# Patient Record
Sex: MALE | Race: BLACK OR AFRICAN AMERICAN | NOT HISPANIC OR LATINO | Employment: UNEMPLOYED | URBAN - METROPOLITAN AREA
[De-identification: names, ages, dates, MRNs, and addresses within clinical notes are randomized per-mention and may not be internally consistent; named-entity substitution may affect disease eponyms.]

---

## 2017-10-03 ENCOUNTER — HOSPITAL ENCOUNTER (EMERGENCY)
Facility: MEDICAL CENTER | Age: 62
End: 2017-10-03
Attending: EMERGENCY MEDICINE
Payer: COMMERCIAL

## 2017-10-03 ENCOUNTER — APPOINTMENT (OUTPATIENT)
Dept: RADIOLOGY | Facility: MEDICAL CENTER | Age: 62
End: 2017-10-03
Attending: EMERGENCY MEDICINE
Payer: COMMERCIAL

## 2017-10-03 VITALS
HEIGHT: 72 IN | SYSTOLIC BLOOD PRESSURE: 140 MMHG | HEART RATE: 68 BPM | BODY MASS INDEX: 25.86 KG/M2 | DIASTOLIC BLOOD PRESSURE: 97 MMHG | WEIGHT: 190.92 LBS | OXYGEN SATURATION: 95 % | TEMPERATURE: 96.8 F | RESPIRATION RATE: 16 BRPM

## 2017-10-03 DIAGNOSIS — R44.3 HALLUCINATIONS: ICD-10-CM

## 2017-10-03 DIAGNOSIS — Z72.0 TOBACCO ABUSE: ICD-10-CM

## 2017-10-03 LAB
ALBUMIN SERPL BCP-MCNC: 3.6 G/DL (ref 3.2–4.9)
ALBUMIN/GLOB SERPL: 0.9 G/DL
ALP SERPL-CCNC: 83 U/L (ref 30–99)
ALT SERPL-CCNC: 6 U/L (ref 2–50)
AMPHET UR QL SCN: NEGATIVE
ANION GAP SERPL CALC-SCNC: 6 MMOL/L (ref 0–11.9)
APTT PPP: 43.6 SEC (ref 24.7–36)
AST SERPL-CCNC: 22 U/L (ref 12–45)
BARBITURATES UR QL SCN: NEGATIVE
BASOPHILS # BLD AUTO: 0.3 % (ref 0–1.8)
BASOPHILS # BLD: 0.01 K/UL (ref 0–0.12)
BENZODIAZ UR QL SCN: NEGATIVE
BILIRUB SERPL-MCNC: 0.7 MG/DL (ref 0.1–1.5)
BUN SERPL-MCNC: 15 MG/DL (ref 8–22)
BZE UR QL SCN: POSITIVE
CALCIUM SERPL-MCNC: 9.4 MG/DL (ref 8.5–10.5)
CANNABINOIDS UR QL SCN: NEGATIVE
CHLORIDE SERPL-SCNC: 103 MMOL/L (ref 96–112)
CO2 SERPL-SCNC: 25 MMOL/L (ref 20–33)
CREAT SERPL-MCNC: 0.86 MG/DL (ref 0.5–1.4)
EOSINOPHIL # BLD AUTO: 0.25 K/UL (ref 0–0.51)
EOSINOPHIL NFR BLD: 8.1 % (ref 0–6.9)
ERYTHROCYTE [DISTWIDTH] IN BLOOD BY AUTOMATED COUNT: 45.1 FL (ref 35.9–50)
GFR SERPL CREATININE-BSD FRML MDRD: >60 ML/MIN/1.73 M 2
GLOBULIN SER CALC-MCNC: 3.9 G/DL (ref 1.9–3.5)
GLUCOSE SERPL-MCNC: 75 MG/DL (ref 65–99)
HCT VFR BLD AUTO: 30.6 % (ref 42–52)
HGB BLD-MCNC: 9.8 G/DL (ref 14–18)
IMM GRANULOCYTES # BLD AUTO: 0 K/UL (ref 0–0.11)
IMM GRANULOCYTES NFR BLD AUTO: 0 % (ref 0–0.9)
INR PPP: 1.17 (ref 0.87–1.13)
LYMPHOCYTES # BLD AUTO: 1.56 K/UL (ref 1–4.8)
LYMPHOCYTES NFR BLD: 50.3 % (ref 22–41)
MCH RBC QN AUTO: 30.2 PG (ref 27–33)
MCHC RBC AUTO-ENTMCNC: 32 G/DL (ref 33.7–35.3)
MCV RBC AUTO: 94.2 FL (ref 81.4–97.8)
METHADONE UR QL SCN: NEGATIVE
MONOCYTES # BLD AUTO: 0.27 K/UL (ref 0–0.85)
MONOCYTES NFR BLD AUTO: 8.7 % (ref 0–13.4)
NEUTROPHILS # BLD AUTO: 1.01 K/UL (ref 1.82–7.42)
NEUTROPHILS NFR BLD: 32.6 % (ref 44–72)
NRBC # BLD AUTO: 0 K/UL
NRBC BLD AUTO-RTO: 0 /100 WBC
OPIATES UR QL SCN: NEGATIVE
OXYCODONE UR QL SCN: NEGATIVE
PCP UR QL SCN: NEGATIVE
PLATELET # BLD AUTO: 178 K/UL (ref 164–446)
PMV BLD AUTO: 10 FL (ref 9–12.9)
POC BREATHALIZER: 0 PERCENT (ref 0–0.01)
POTASSIUM SERPL-SCNC: 3.6 MMOL/L (ref 3.6–5.5)
PROPOXYPH UR QL SCN: NEGATIVE
PROT SERPL-MCNC: 7.5 G/DL (ref 6–8.2)
PROTHROMBIN TIME: 15.3 SEC (ref 12–14.6)
RBC # BLD AUTO: 3.25 M/UL (ref 4.7–6.1)
SODIUM SERPL-SCNC: 134 MMOL/L (ref 135–145)
WBC # BLD AUTO: 3.1 K/UL (ref 4.8–10.8)

## 2017-10-03 PROCEDURE — 302970 POC BREATHALIZER: Performed by: EMERGENCY MEDICINE

## 2017-10-03 PROCEDURE — 80307 DRUG TEST PRSMV CHEM ANLYZR: CPT

## 2017-10-03 PROCEDURE — 93970 EXTREMITY STUDY: CPT | Mod: 26 | Performed by: SURGERY

## 2017-10-03 PROCEDURE — 85610 PROTHROMBIN TIME: CPT

## 2017-10-03 PROCEDURE — 90791 PSYCH DIAGNOSTIC EVALUATION: CPT

## 2017-10-03 PROCEDURE — 99284 EMERGENCY DEPT VISIT MOD MDM: CPT

## 2017-10-03 PROCEDURE — 70450 CT HEAD/BRAIN W/O DYE: CPT

## 2017-10-03 PROCEDURE — 700102 HCHG RX REV CODE 250 W/ 637 OVERRIDE(OP): Performed by: EMERGENCY MEDICINE

## 2017-10-03 PROCEDURE — 85025 COMPLETE CBC W/AUTO DIFF WBC: CPT

## 2017-10-03 PROCEDURE — A9270 NON-COVERED ITEM OR SERVICE: HCPCS | Performed by: EMERGENCY MEDICINE

## 2017-10-03 PROCEDURE — 85730 THROMBOPLASTIN TIME PARTIAL: CPT

## 2017-10-03 PROCEDURE — 80053 COMPREHEN METABOLIC PANEL: CPT

## 2017-10-03 PROCEDURE — 93970 EXTREMITY STUDY: CPT

## 2017-10-03 RX ORDER — OLANZAPINE 10 MG/1
10 TABLET ORAL
Qty: 30 TAB | Refills: 0 | Status: SHIPPED | OUTPATIENT
Start: 2017-10-03

## 2017-10-03 RX ORDER — OLANZAPINE 5 MG/1
10 TABLET ORAL ONCE
Status: COMPLETED | OUTPATIENT
Start: 2017-10-03 | End: 2017-10-03

## 2017-10-03 RX ADMIN — OLANZAPINE 10 MG: 5 TABLET, FILM COATED ORAL at 10:53

## 2017-10-03 NOTE — ED PROVIDER NOTES
"ED Provider Note    ER PROVIDER NOTE    Scribed for Angel Luis Echavarria M.D.  by Angel Luis Echavarria. 10/3/2017 at 7:45 AM.    Primary Care Provider: No primary care provider on file.  Means of Arrival: pt   History obtained from: pt   History limited by: none     CHIEF COMPLAINT  Chief Complaint   Patient presents with   • Psych Eval     depressed.  denies HI/SI       HPI  Julio Cesar Frazier is a 62 y.o. male who presents to the emergency department complaining ofSeeing demons. Patient reports that he has been seeing these demons for a long time, they are \"invading his body\". He denies any suicidal thoughts or homicidal thoughts. Patient denies any ingestions. Also patient does report he has history of blood clots in his legs and has not been taking his medications for them because the demons keeps stealing them. He denies any chest pain or difficulty breathing. No fevers or chills. No headaches. No other complaints    REVIEW OF SYSTEMS  Pertinent positives include hallucinations. Pertinent negatives include no fever. See HPI for details. All other systems reviewed and are negative.    PAST MEDICAL HISTORY       SURGICAL HISTORY  patient denies any surgical history    FAMILY HISTORY  History reviewed. No pertinent family history.    SOCIAL HISTORY  Social History     Social History   • Marital status: N/A     Spouse name: N/A   • Number of children: N/A   • Years of education: N/A     Social History Main Topics   • Smoking status: Current Every Day Smoker     Packs/day: 1.00     Types: Cigarettes   • Smokeless tobacco: Never Used   • Alcohol use No   • Drug use: No      Comment: Prvious drug use   • Sexual activity: Not on file     Other Topics Concern   • Not on file     Social History Narrative   • No narrative on file      History   Drug Use No     Comment: Prvious drug use       CURRENT MEDICATIONS  Home Medications     Reviewed by Davi Marie R.N. (Registered Nurse) on 10/03/17 at 0743  Med List Status: " Not Addressed   Medication Last Dose Status        Patient Carlos Taking any Medications                       ALLERGIES  Allergies   Allergen Reactions   • Haldol [Haloperidol] Unspecified     unknow   • Keflex Unspecified     unknown       PHYSICAL EXAM  VITAL SIGNS: /97   Pulse 69   Temp 36 °C (96.8 °F)   Resp 16   Ht 1.829 m (6')   Wt 86.6 kg (190 lb 14.7 oz)   SpO2 97%   BMI 25.89 kg/m²   Pulse ox interpretation: I interpret this pulse ox as normal.    Constitutional: Alert in no apparent distress.  HENT: No signs of trauma, Bilateral external ears normal, Nose normal.   Eyes: Pupils are equal and reactive, Conjunctiva normal, Non-icteric.   Neck: Normal range of motion, No tenderness, Supple, No stridor.   Lymphatic: No lymphadenopathy noted.   Cardiovascular: Regular rate and rhythm, no murmurs.   Thorax & Lungs: Normal breath sounds, No respiratory distress, No wheezing, No chest tenderness.   Abdomen: Bowel sounds normal, Soft, No tenderness, No masses, No pulsatile masses. No peritoneal signs.  Skin: Warm, Dry, No erythema, No rash.   Back: No bony tenderness, No CVA tenderness.   Extremities: Intact distal pulses, No edema, No tenderness, No cyanosis, Negative Howard's sign.  Musculoskeletal: Good range of motion in all major joints. No tenderness to palpation or major deformities noted.   Neurologic: Alert , Normal motor function, Normal sensory function, No focal deficits noted.   Psychiatric: Patient is somewhat disorganized but linear, unkempt    DIAGNOSTIC STUDIES / PROCEDURES        LABS  Results for orders placed or performed during the hospital encounter of 10/03/17   CBC WITH DIFFERENTIAL   Result Value Ref Range    WBC 3.1 (L) 4.8 - 10.8 K/uL    RBC 3.25 (L) 4.70 - 6.10 M/uL    Hemoglobin 9.8 (L) 14.0 - 18.0 g/dL    Hematocrit 30.6 (L) 42.0 - 52.0 %    MCV 94.2 81.4 - 97.8 fL    MCH 30.2 27.0 - 33.0 pg    MCHC 32.0 (L) 33.7 - 35.3 g/dL    RDW 45.1 35.9 - 50.0 fL    Platelet Count 178  164 - 446 K/uL    MPV 10.0 9.0 - 12.9 fL    Neutrophils-Polys 32.60 (L) 44.00 - 72.00 %    Lymphocytes 50.30 (H) 22.00 - 41.00 %    Monocytes 8.70 0.00 - 13.40 %    Eosinophils 8.10 (H) 0.00 - 6.90 %    Basophils 0.30 0.00 - 1.80 %    Immature Granulocytes 0.00 0.00 - 0.90 %    Nucleated RBC 0.00 /100 WBC    Neutrophils (Absolute) 1.01 (L) 1.82 - 7.42 K/uL    Lymphs (Absolute) 1.56 1.00 - 4.80 K/uL    Monos (Absolute) 0.27 0.00 - 0.85 K/uL    Eos (Absolute) 0.25 0.00 - 0.51 K/uL    Baso (Absolute) 0.01 0.00 - 0.12 K/uL    Immature Granulocytes (abs) 0.00 0.00 - 0.11 K/uL    NRBC (Absolute) 0.00 K/uL   COMP METABOLIC PANEL   Result Value Ref Range    Sodium 134 (L) 135 - 145 mmol/L    Potassium 3.6 3.6 - 5.5 mmol/L    Chloride 103 96 - 112 mmol/L    Co2 25 20 - 33 mmol/L    Anion Gap 6.0 0.0 - 11.9    Glucose 75 65 - 99 mg/dL    Bun 15 8 - 22 mg/dL    Creatinine 0.86 0.50 - 1.40 mg/dL    Calcium 9.4 8.5 - 10.5 mg/dL    AST(SGOT) 22 12 - 45 U/L    ALT(SGPT) 6 2 - 50 U/L    Alkaline Phosphatase 83 30 - 99 U/L    Total Bilirubin 0.7 0.1 - 1.5 mg/dL    Albumin 3.6 3.2 - 4.9 g/dL    Total Protein 7.5 6.0 - 8.2 g/dL    Globulin 3.9 (H) 1.9 - 3.5 g/dL    A-G Ratio 0.9 g/dL   PTT   Result Value Ref Range    APTT 43.6 (H) 24.7 - 36.0 sec   PT/INR   Result Value Ref Range    PT 15.3 (H) 12.0 - 14.6 sec    INR 1.17 (H) 0.87 - 1.13   ESTIMATED GFR   Result Value Ref Range    GFR If African American >60 >60 mL/min/1.73 m 2    GFR If Non African American >60 >60 mL/min/1.73 m 2   POC BREATHALIZER   Result Value Ref Range    POC Breathalizer 0.000 0.00 - 0.01 Percent       All labs reviewed by me.    RADIOLOGY  CT-HEAD W/O   Final Result      No evidence of acute intracranial process.      LE VENOUS DUPLEX (Specify in Comments Left, Right Or Bilateral)         negative  The radiologist's interpretation of all radiological studies have been reviewed by me.    COURSE & MEDICAL DECISION MAKING  Nursing notes, VS, PMSFHx reviewed in  chart.    7:45 AM Patient seen and examined at bedside. Ordered forBlood work, CT, ultrasound to evaluate his symptoms.     10:13 AM  Patient reevaluated, he is comfortable, states he feels comfortable being discharged. Has had discussion with life skills    Decision Making:  This is a 62 y.o. male Presenting with hallucinations. This appears to be a long-standing issue for him with no acute decompensation. He'll be started on Zyprexa, follow-up as outpatient. He is passing through the area and plans to follow-up in [place] if not will follow-up at our clinic. He is not suicidal or homicidal and I feel appropriate for outpatient management from this perspective. Patient additionally reported some chronic medical complaints which were evaluated. He has no evidence of persistent DVT on his ultrasound here. No finding of his blood work to suggest significant metabolic or electrolyte disturbance. No infectious symptoms. He has negative CT head without evidence of mass or other significant lesion     The patient will return for new or worsening symptoms and is stable at the time of discharge.    The patient is referred to a primary physician for blood pressure management, diabetic screening, and for all other preventative health concerns.    DISPOSITION:  Patient will be discharged home in stable condition.    FOLLOW UP:  [facility]  [address]  [place] [address]  [phone]      As needed      OUTPATIENT MEDICATIONS:  New Prescriptions    OLANZAPINE (ZYPREXA) 10 MG TABLET    Take 1 Tab by mouth every bedtime.         FINAL IMPRESSION  1. Hallucinations    2. Tobacco abuse        The note accurately reflects work and decisions made by me.  Angel Luis Echavarria  10/3/2017  2:33 PM

## 2017-10-03 NOTE — CONSULTS
"[facility]   TRIAGE ASSESSMENT    Name: Julio Cesar Frazier  MRN: [id number]  : 1955  Age: 62 y.o.  Date of assessment: 10/3/2017  PCP: No primary care provider on file.  Persons in attendance: Patient    CHIEF COMPLAINT/PRESENTING ISSUE as stated by Treva Ovalle team RN, patient is reporting somatic issues.  Possibly psychotic.  Denies SI/HI.    Information collected:  Patient reports he has had these problems for 13 years.  He wants to replace his medications that have disappeared.  He does not plan to stay in [place]. He says he was in a coma for 6 months in [place].  He was in [place] after the coma from April to 2016.  Then his sister in [place] came to get him.  He says he is running from being haunted, he does not know why he is haunted.  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   He is not getting treatment because he will not sleep with them.  That he has had 2 strokes. He denies any HI, command hallucinations or delusions or any prior suicide attempts or ever being arrested or access to guns.  He says his bank account is missing $20,000.   \"My account only has $51 in it now.\"    Chief Complaint   Patient presents with   • Psych Eval     depressed.  denies HI/SI        CURRENT LIVING SITUATION/SOCIAL SUPPORT: [facility] receives $937.  Homeless.  He said he does not plan on staying in [place] after telling him that if he does he will get $200 less in [place].      BEHAVIORAL HEALTH TREATMENT HISTORY  Does patient/parent report a history of prior behavioral health treatment for patient?   Yes:  [place] over [place]  Dates Level of Care Facilty/Provider Diagnosis/Problem Medications    inpt [place] Scizophrenia Zyprexa and Ativan also Coumadin    inpt [place]      inpt [place]      in[place]                                                       SAFETY ASSESSMENT - SELF  Does patient acknowledge current or past symptoms of dangerousness to self? no  Does parent/significant other " "report patient has current or past symptoms of dangerousness to self? N\A  Does presenting problem suggest symptoms of dangerousness to self? No    SAFETY ASSESSMENT - OTHERS  Does patient acknowledge current or past symptoms of aggressive behavior or risk to others? no  Does parent/significant other report patient has current or past symptoms of aggressive behavior or risk to others?  N\A  Does presenting problem suggest symptoms of dangerousness to others? No    Crisis Safety Plan completed and copy given to patient? N\A    ABUSE/NEGLECT SCREENING  Does patient report feeling “unsafe” in his/her home, or afraid of anyone?  no  Does patient report any history of physical, sexual, or emotional abuse?  no  Does parent or significant other report any of the above? N\A  Is there evidence of neglect by self?  no  Is there evidence of neglect by a caregiver? no  Does the patient/parent report any history of CPS/APS/police involvement related to suspected abuse/neglect or domestic violence? no  Based on the information provided during the current assessment, is a mandated report of suspected abuse/neglect being made?  No    SUBSTANCE USE SCREENING  Yes:  Vasquez all substances used in the past 30 days:  Denies now.  Use to use.      Last Use Amount   []   Alcohol     []   Marijuana     []   Heroin     []   Prescription Opioids  (used without prescription, for    recreation, or in excess of prescribed amount)     []   Other Prescription  (used without prescription, for    recreation, or in excess of prescribed amount)     []   Cocaine      []   Methamphetamine     []   \"\" drugs (ectasy, MDMA)     []   Other substances        UDS results: positive for cocaine  Breathalyzer results: 0.0    What consequences does the patient associate with any of the above substance use and or addictive behaviors? None    Risk factors for detox (check all that apply):  []  Seizures   []  Diaphoretic (sweating)   []  Tremors   []  " Hallucinations   []  Increased blood pressure   []  Decreased blood pressure   []  Other   []  None      [] Patient education on risk factors for detoxification and instructed to return to ER as needed.        MENTAL STATUS   Participation: Active verbal participation, Attentive and Open to feedback  Grooming: Casual  Orientation: Evidence of hallucinations present  Behavior: Calm  Eye contact: Good  Mood: Euthymic  Affect: Flexible and Full range  Thought process: Circumstantial  Thought content: Evidence of delusion  Speech: Volume within normal limits  Perception: Evidence of hallucination  Memory:  No gross evidence of memory deficits  Insight: Adequate  Judgment:  Adequate  Other:    Collateral information:   Source:  [] Significant other present in person:   [] Significant other by telephone  [] [facility]   [] [facility] Nursing Staff  [x] [facility]  [] Other:     [] Unable to complete full assessment due to:  [] Acute intoxication  [] Patient declined to participate/engage  [] Patient verbally unresponsive  [] Significant cognitive deficits  [] Significant perceptual distortions or behavioral disorganization  [] Other:      CLINICAL IMPRESSIONS:  Primary:  Psychosis NOS, R/O schizophrenia  Secondary:  Homeless       IDENTIFIED NEEDS/PLAN:  [Trigger DISPOSITION list for any items marked]    []  Imminent safety risk - self [] Imminent safety risk - others   []  Acute substance withdrawal [x]  Psychosis/Impaired reality testing   []  Mood/anxiety []  Substance use/Addictive behavior   []  Maladaptive behaviro []  Parent/child conflict   []  Family/Couples conflict []  Biomedical   [x]  Housing [x]  Financial   []   Legal  Occupational/Educational   []  Domestic violence []  Other:     Disposition: Refer to [facility], [facility] and [facility]    Does patient express agreement with the above plan? yes    Referral appointment(s) scheduled? N\A    Alert team only:   I have discussed  findings and recommendations with Dr. Echavarria who is in agreement with these recommendations.     Referral information sent to the following community providers :None    Stephenie Cosby R.N.  10/3/2017

## 2017-10-03 NOTE — ED NOTES
Chief Complaint   Patient presents with   • Psych Eval     depressed.  denies HI/SI     States that he has demonic forces after him.  Also states that there are animals in his blood stream biting him wherever he goes.  Triage process explained to patient.  Pt back to waiting room.  Pt instructed to inform RN if any changes or questions arise.

## 2017-10-03 NOTE — ED NOTES
Patient given discharge instructions and verbalized understanding.  Pt dc'd home with 1 RX. VSS NAD. Patient ambulated to triage, steady gait noted

## 2017-10-03 NOTE — DISCHARGE INSTRUCTIONS
Hallucinations and Delusions  You seem to be having hallucinations and/or delusions. You may be hearing voices that no one else can hear. This can seem very real to you. You may be having thoughts and fears that do not make sense to others. This condition can be due to mental disease like schizophrenia. It may be caused by a medical condition, such as an infection or electrolyte disturbance. These symptoms are also seen in drug abusers, especially those who use crack cocaine and amphetamines. Drugs like PCP, LSD, MDMA, peyote, and psilocybin can also cause frightening hallucinations and loss of control.  If your symptoms are due to drug abuse, your mental state should improve as the drug(s) leave your system. Someone you trust should be with you until you are better to protect you and calm your fears. Often tranquilizers are very helpful at controlling hallucinations, anxiety, and destructive behavior. Getting a proper diet and enough sleep is important to recovery. If your symptoms are not due to drugs, or do not improve over several days after stopping drug use, you need further medical or mental health care.  SEEK IMMEDIATE MEDICAL CARE IF:   · Your symptoms get worse, especially if you think your life is in danger  · You have violent or destructive thoughts.  Recovery is possible, but you have to get proper treatment and avoid drugs that are known to cause you trouble.  Document Released: 01/25/2006 Document Revised: 03/11/2013 Document Reviewed: 12/18/2006  [employer]® Patient Information ©2014 [employer].

## 2017-10-03 NOTE — ED NOTES
Primary assessment complete, Patient in room. Patient is in the room and currently uncooperative. Patient states that he is being haunted by larry and has been for the past 13 years. He is A&Ox4

## 2017-10-03 NOTE — ED NOTES
Vasquez from the alert team saw pt and states that pt should get a room and be seen by erp.  Charge RN aware.